# Patient Record
Sex: FEMALE | Race: WHITE
[De-identification: names, ages, dates, MRNs, and addresses within clinical notes are randomized per-mention and may not be internally consistent; named-entity substitution may affect disease eponyms.]

---

## 2020-09-10 ENCOUNTER — HOSPITAL ENCOUNTER (EMERGENCY)
Dept: HOSPITAL 62 - ER | Age: 20
Discharge: HOME | End: 2020-09-10
Payer: MEDICAID

## 2020-09-10 VITALS — SYSTOLIC BLOOD PRESSURE: 116 MMHG | DIASTOLIC BLOOD PRESSURE: 58 MMHG

## 2020-09-10 DIAGNOSIS — O20.9: Primary | ICD-10-CM

## 2020-09-10 DIAGNOSIS — Z3A.01: ICD-10-CM

## 2020-09-10 LAB
ADD MANUAL DIFF: NO
ANION GAP SERPL CALC-SCNC: 9 MMOL/L (ref 5–19)
BASOPHILS # BLD AUTO: 0.1 10^3/UL (ref 0–0.2)
BASOPHILS NFR BLD AUTO: 0.7 % (ref 0–2)
BUN SERPL-MCNC: 10 MG/DL (ref 7–20)
CALCIUM: 9.4 MG/DL (ref 8.4–10.2)
CHLORIDE SERPL-SCNC: 106 MMOL/L (ref 98–107)
CO2 SERPL-SCNC: 25 MMOL/L (ref 22–30)
EOSINOPHIL # BLD AUTO: 0.4 10^3/UL (ref 0–0.6)
EOSINOPHIL NFR BLD AUTO: 3.9 % (ref 0–6)
ERYTHROCYTE [DISTWIDTH] IN BLOOD BY AUTOMATED COUNT: 12.6 % (ref 11.5–14)
GLUCOSE SERPL-MCNC: 94 MG/DL (ref 75–110)
HCT VFR BLD CALC: 39 % (ref 36–47)
HGB BLD-MCNC: 13.5 G/DL (ref 12–15.5)
LYMPHOCYTES # BLD AUTO: 2.8 10^3/UL (ref 0.5–4.7)
LYMPHOCYTES NFR BLD AUTO: 29.7 % (ref 13–45)
MCH RBC QN AUTO: 30.6 PG (ref 27–33.4)
MCHC RBC AUTO-ENTMCNC: 34.5 G/DL (ref 32–36)
MCV RBC AUTO: 89 FL (ref 80–97)
MONOCYTES # BLD AUTO: 0.5 10^3/UL (ref 0.1–1.4)
MONOCYTES NFR BLD AUTO: 5.6 % (ref 3–13)
NEUTROPHILS # BLD AUTO: 5.6 10^3/UL (ref 1.7–8.2)
NEUTS SEG NFR BLD AUTO: 60.1 % (ref 42–78)
PLATELET # BLD: 317 10^3/UL (ref 150–450)
POTASSIUM SERPL-SCNC: 4.4 MMOL/L (ref 3.6–5)
RBC # BLD AUTO: 4.41 10^6/UL (ref 3.72–5.28)
TOTAL CELLS COUNTED % (AUTO): 100 %
WBC # BLD AUTO: 9.4 10^3/UL (ref 4–10.5)

## 2020-09-10 PROCEDURE — 84702 CHORIONIC GONADOTROPIN TEST: CPT

## 2020-09-10 PROCEDURE — 76817 TRANSVAGINAL US OBSTETRIC: CPT

## 2020-09-10 PROCEDURE — 86900 BLOOD TYPING SEROLOGIC ABO: CPT

## 2020-09-10 PROCEDURE — 85025 COMPLETE CBC W/AUTO DIFF WBC: CPT

## 2020-09-10 PROCEDURE — 86901 BLOOD TYPING SEROLOGIC RH(D): CPT

## 2020-09-10 PROCEDURE — 99284 EMERGENCY DEPT VISIT MOD MDM: CPT

## 2020-09-10 PROCEDURE — 86850 RBC ANTIBODY SCREEN: CPT

## 2020-09-10 PROCEDURE — 36415 COLL VENOUS BLD VENIPUNCTURE: CPT

## 2020-09-10 PROCEDURE — 80048 BASIC METABOLIC PNL TOTAL CA: CPT

## 2020-09-10 NOTE — ER DOCUMENT REPORT
ED General





- General


Chief Complaint: Vag Bleeding, +preg <12wks


Stated Complaint: VAGINAL BLEEDING/ABDOMINAL PAIN


Time Seen by Provider: 09/10/20 09:52


Primary Care Provider: 


[facility] [Provider Group] - 09/13/20 (For repeat hormone 

measurement and possible ultrasound)


Notes: 





1-year-old female G2, P1 with unplanned but desired pregnancy at 4 weeks and 3 

days by dates.  Comes in with malaise and nausea.  No urinary symptoms, 

abdominal pain.  Did develop spotting today x2 with one small clot but no v

isible products of conception and no pain.  She was seen in the ED previously 

for the same and diagnosed as pregnant on urine has not seen women's health.





- Related Data


Allergies/Adverse Reactions: 


                                        





amoxicillin Allergy (Verified 09/10/20 09:52)


   


Penicillins Allergy (Verified 09/10/20 09:52)


   











Past Medical History





- General


Information source: Patient





- Social History


Smoking Status: Never Smoker


Frequency of alcohol use: None


Drug Abuse: None


Family History: None





Review of Systems





- Review of Systems


Notes: 





REVIEW OF SYSTEMS





GEN: Denies fever, chills, weight loss


ENT: Denies sore throat, nasal discharge, ear pain


EYES: Denies blurry vision, eye pain, discharge


CV: Denies chest pain, palpitations, edema


RESP: Denies cough, shortness of breath, wheezing


GI: Vaginal spotting


MSK: Denies joint pain/swelling, edema, 


SKIN: Denies rash, skin lesions


LYMPH: Denies swollen glands/lymph nodes


NEURO: Denies headache, focal weakness or numbness, dizziness


PSYCH: Denies depression, suicidal or homicidal ideation








PHYSICAL EXAMINATION





General: No acute distress, well-nourished


Head: Atraumatic, normocephalic


ENT: Mouth normal, oropharynx moist, no exudates or tonsillar enlargement


Eyes: Conjunctiva normal, pupils equal, lids normal


Neck: No JVD, supple, no guarding


CVS: Normal rate, regular rhythm, no murmurs


Resp: No resp distress, equal and normal breath sounds bilaterally


GI: Nondistended, soft, no tenderness to palpation, no rebound or guarding


Ext: No deformities, no edema, normal range of motion in upper and lower ext


Back: No CVA or midline TTP


Skin: No rash, warm


Lymphatic: No lymphadeopathy noted


Neuro: Awake, alert.  Face symmetric.  GCS 15.





Physical Exam





- Vital signs


Vitals: 


                                        











Temp Pulse Resp BP Pulse Ox


 


 98.5 F   79   20   117/60   97 


 


 09/10/20 09:48  09/10/20 09:48  09/10/20 09:48  09/10/20 09:48  09/10/20 09:48














Course





- Re-evaluation


Re-evalutation: 





09/10/20 10:07


 1st trimester spotting hemodynamically stable no tenderness to really to see on

transabdominal ultrasound.  Will check transvaginal and beta but likely severe 

pregnancy of unknown location the differential includes normal ectopic 

unruptured and spontaneous miscarriage.


09/10/20 11:26


Ultrasound shows thickened and return likely early IUP but not confirmatory 

secondary to dates


Pregnancy of unknown location instructions given we will follow-up with primary 

care recommended B6 pyridoxine/Unisom over-the-counter


I have discussed with the patient there likely diagnosis, aftercare plan, 

follow-up plans and my usual and customary return precautions.  They verbalized 

understanding of this.





- Vital Signs


Vital signs: 


                                        











Temp Pulse Resp BP Pulse Ox


 


 98.5 F   79   20   117/60   97 


 


 09/10/20 09:48  09/10/20 09:48  09/10/20 09:48  09/10/20 09:48  09/10/20 09:48














- Laboratory


Result Diagrams: 


                                 09/10/20 10:06





                                 09/10/20 10:06


Laboratory results interpreted by me: 


                                        











  09/10/20





  10:25


 


Beta HCG, Quant  47.37 H














- Diagnostic Test


Radiology reviewed: Image reviewed, Reports reviewed





Procedures





- Ultrasound/Bedside


  ** Ultrasound/Bedside


Ultrasound: Other - Transabdominal pelvic limited.  Normal anteflexed uterus 

with normal stripe thickness and no obvious intrauterine pregnancy.  No free flu

id.





Discharge





- Discharge


Clinical Impression: 


 First trimester bleeding





Condition: Good


Disposition: HOME, SELF-CARE


Instructions:  Threatened Miscarriage ([facility])


Referrals: 


[facility] [Provider Group] - 09/13/20 (For repeat hormone 

measurement and possible ultrasound)

## 2020-09-10 NOTE — RADIOLOGY REPORT (SQ)
EXAM DESCRIPTION:  U/S OB TRANSVAGINAL W/O DOP



IMAGES COMPLETED DATE/TIME:  9/10/2020 11:08 am



REASON FOR STUDY:  preg VB



COMPARISON:  None.



TECHNIQUE:  Transvaginal static and realtime grayscale images acquired of the pelvis. Additional alejandro
cted spectral and color Doppler images recorded. All images stored on PACs.

CLINICAL AGE: 5 weeks 3 days

BHCG: Not available.



LIMITATIONS:  None.



FINDINGS:  UTERUS: No visualized intrauterine pregnancy.

RIGHT ADNEXA: Normal ovary with normal vascular flow.

No adnexal free fluid.

No adnexal masses.

LEFT ADNEXA: Normal ovary with normal vascular flow.

No adnexal free fluid.

No adnexal masses.

FREE FLUID: None.

OTHER: No other significant finding.



IMPRESSION:  NO VISUALIZED INTRA- OR EXTRAUTERINE PREGNANCY.

bHCG LEVEL NOT AVAILABLE FOR CORRELATION WITH US FINDINGS.

ECTOPIC PREGNANCY CANNOT BE EXCLUDED.

FOLLOW-UP ULTRASOUND AND SERIAL BHCG LEVELS STRONGLY RECOMMENDED TO ACCURATELY ASSESS PREGNANCY STATU
S.



TECHNICAL DOCUMENTATION:  JOB ID:  [id number]

 2011 [employer]- All Rights Reserved



Reading location - IP/workstation name: [email or web address]